# Patient Record
Sex: FEMALE | Race: WHITE | Employment: PART TIME | URBAN - METROPOLITAN AREA
[De-identification: names, ages, dates, MRNs, and addresses within clinical notes are randomized per-mention and may not be internally consistent; named-entity substitution may affect disease eponyms.]

---
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
## 2020-05-29 ENCOUNTER — OFFICE VISIT (OUTPATIENT)
Dept: SURGERY | Age: 20
End: 2020-05-29
Payer: COMMERCIAL

## 2020-05-29 VITALS
DIASTOLIC BLOOD PRESSURE: 80 MMHG | RESPIRATION RATE: 16 BRPM | HEART RATE: 86 BPM | WEIGHT: 135 LBS | HEIGHT: 60 IN | SYSTOLIC BLOOD PRESSURE: 110 MMHG | BODY MASS INDEX: 26.5 KG/M2 | OXYGEN SATURATION: 97 % | TEMPERATURE: 98.1 F

## 2020-05-29 PROCEDURE — 1036F TOBACCO NON-USER: CPT | Performed by: PLASTIC SURGERY

## 2020-05-29 PROCEDURE — G8419 CALC BMI OUT NRM PARAM NOF/U: HCPCS | Performed by: PLASTIC SURGERY

## 2020-05-29 PROCEDURE — G8427 DOCREV CUR MEDS BY ELIG CLIN: HCPCS | Performed by: PLASTIC SURGERY

## 2020-05-29 PROCEDURE — 99204 OFFICE O/P NEW MOD 45 MIN: CPT | Performed by: PLASTIC SURGERY

## 2020-05-29 NOTE — PROGRESS NOTES
Medical: Not on file     Non-medical: Not on file   Tobacco Use    Smoking status: Never Smoker    Smokeless tobacco: Never Used   Substance and Sexual Activity    Alcohol use: No    Drug use: No    Sexual activity: Never   Lifestyle    Physical activity     Days per week: Not on file     Minutes per session: Not on file    Stress: Not on file   Relationships    Social connections     Talks on phone: Not on file     Gets together: Not on file     Attends Mu-ism service: Not on file     Active member of club or organization: Not on file     Attends meetings of clubs or organizations: Not on file     Relationship status: Not on file    Intimate partner violence     Fear of current or ex partner: Not on file     Emotionally abused: Not on file     Physically abused: Not on file     Forced sexual activity: Not on file   Other Topics Concern    Not on file   Social History Narrative    Not on file     Family History:   Family History   Problem Relation Age of Onset    No Known Problems Mother     Cancer Father         colon    Breast Cancer Maternal Grandmother         lymphoma    Cancer Paternal Grandmother         lung       REVIEW OF SYSTEMS:    CONSTITUTIONAL:  negative for  fevers, chills, sweats and fatigue  EYES: negative for dipolpia or acute vision loss. RESPIRATORY:  negative for  dry cough, cough with sputum, dyspnea, wheezing and chest pain  HENT:negative for pain, headache, difficulty swallowing or nose bleeds. CARDIOVASCULAR:  negative for  chest pain, dyspnea, palpitations, syncope  GASTROINTESTINAL:  negative for nausea, vomiting, change in bowel habits, diarrhea, constipation and abdominal pain  EXTREMITIES: negative for edema  MUSCULOSKELETAL: negative for muscle weakness  SKIN: positive for lesionnegative for itching or rashes.   HEME: negative for easy brusing or bleeding  BEHAVIOR/PSYCH:  negative for poor appetite, increased appetite, decreased sleep and poor concentration  PSYCH: Alert and oriented to person place and time    I have reviewed the chief complaint and history of present illness including (ROS and PFSH) and vital documentation by my staff and I agree with their documentation and have added when applicable. PHYSICAL EXAM:        VITALS:  /80 (Site: Left Upper Arm, Position: Sitting, Cuff Size: Medium Adult)   Pulse 86   Temp 98.1 °F (36.7 °C) (Temporal)   Resp 16   Ht 5' (1.524 m)   Wt 135 lb (61.2 kg)   LMP 05/18/2020   SpO2 97%   Breastfeeding No   BMI 26.37 kg/m²   CONSTITUTIONAL:  awake, alert, cooperative, no apparent distress, and appears stated age  EYES: PERRLA, EOMI, no signs of occular infection  LUNGS:  No increased work of breathing, good air exchange  CARDIOVASCULAR:  regular rate and rhythm   EXTREMITIES: no signs of clubbing or cyanosis. MUSCULOSKELETAL: negative for flaccid muscle tone or spastic movements. NEURO: Cranial nerves II-XII grossly intact. No signs of agitated mood. SKIN: Right parietal scalp-  10mm x 10mm,  flesh in color, irregular border, raised, no signs of bleeding,drainage or infection. Non tender to palpation. There is a small 1 mm mass just anterior and medial to the prominent lesion. No pain or punctum.      DATA:    Labs: CBC:   Lab Results   Component Value Date    WBC 9.8 05/23/2013    RBC 4.05 05/23/2013    HGB 12.0 05/23/2013    HCT 36.0 05/23/2013    MCV 88.9 05/23/2013    MCH 29.6 05/23/2013    MCHC 33.3 05/23/2013    RDW 13.1 05/23/2013     05/23/2013    MPV 8.6 05/23/2013     BMP:    Lab Results   Component Value Date     05/22/2013    K 4.1 05/22/2013     05/22/2013    CO2 27 05/22/2013    BUN 8 05/22/2013    LABALBU 5.1 05/22/2013    CREATININE 0.6 05/22/2013    CALCIUM 9.7 05/22/2013    GLUCOSE 104 05/22/2013       Radiology Review:  No radiology needed at this time    IMPRESSION/RECOMMENDATIONS:      Diagnosis  -) Subcutaneous mass of right parietal scalp      -The

## 2020-06-02 ENCOUNTER — TELEPHONE (OUTPATIENT)
Dept: SURGERY | Age: 20
End: 2020-06-02

## 2020-08-19 ENCOUNTER — HOSPITAL ENCOUNTER (OUTPATIENT)
Age: 20
Discharge: HOME | End: 2020-08-21
Payer: COMMERCIAL

## 2020-08-19 ENCOUNTER — PROCEDURE VISIT (OUTPATIENT)
Dept: SURGERY | Age: 20
End: 2020-08-19
Payer: COMMERCIAL

## 2020-08-19 VITALS
WEIGHT: 135 LBS | HEIGHT: 60 IN | BODY MASS INDEX: 26.5 KG/M2 | TEMPERATURE: 97.7 F | OXYGEN SATURATION: 98 % | DIASTOLIC BLOOD PRESSURE: 62 MMHG | SYSTOLIC BLOOD PRESSURE: 94 MMHG

## 2020-08-19 PROCEDURE — 88304 TISSUE EXAM BY PATHOLOGIST: CPT

## 2020-08-19 PROCEDURE — 11421 EXC H-F-NK-SP B9+MARG 0.6-1: CPT | Performed by: PLASTIC SURGERY

## 2020-08-19 PROCEDURE — 87491 CHLMYD TRACH DNA AMP PROBE: CPT

## 2020-08-19 PROCEDURE — 12031 INTMD RPR S/A/T/EXT 2.5 CM/<: CPT | Performed by: PLASTIC SURGERY

## 2020-08-19 PROCEDURE — 87591 N.GONORRHOEAE DNA AMP PROB: CPT

## 2020-08-19 RX ORDER — LIDOCAINE HYDROCHLORIDE AND EPINEPHRINE 10; 10 MG/ML; UG/ML
3 INJECTION, SOLUTION INFILTRATION; PERINEURAL ONCE
Status: COMPLETED | OUTPATIENT
Start: 2020-08-19 | End: 2020-08-19

## 2020-08-19 RX ADMIN — LIDOCAINE HYDROCHLORIDE AND EPINEPHRINE 3 ML: 10; 10 INJECTION, SOLUTION INFILTRATION; PERINEURAL at 13:48

## 2020-08-19 NOTE — PROGRESS NOTES
Subjective: Follow up today for excisional biopsy of subcutaneous mass of scalp x2. Denies fever, nausea, vomiting, leg pain or swelling. The patient voices understanding of the procedure they are having today and would like to proceed. Objective:    BP 94/62 (Site: Left Upper Arm, Position: Sitting, Cuff Size: Medium Adult)   Temp 97.7 °F (36.5 °C) (Temporal)   Ht 5' (1.524 m)   Wt 135 lb (61.2 kg)   SpO2 98%   BMI 26.37 kg/m²       Midline scalp inferior mass  Lesion- 10mm x 10mm  Margin- 2mm  Defect- 10mm x 8mm  Scar-12mm     Midline scalp superior mass  Lesion- 5mm x 5mm  Margin- 2mm  Defect- 5mm x 2mm  Scar-5mm     Assessment:    Patient Active Problem List   Diagnosis    Appendicitis       Plan:       Diagnosis  -) Subcutaneous mass of scalp x2      -The risks, benefits and options were discussed with the pt. The risks included but not limited to pain, bleeding, infection, heavy scarring, damage to surrounding structures, fluid collections, asymmetry, and need for further procedures. All of Her questions were answered to their satisfaction and She agrees to proceed with the procedure.      -After consent was obtained, the area was cleansed with an alcohol swab. Local anesthesia consisting of 1% lidocaine with 1:100,000 epinepherine was injected  surrounding the area. The local was allowed to work. Using a 10-blade the Subcutaneous mass of scalp x2 was excised,  Intermediate  repair was performed. The wound(s) were closed with 5-0 Monocryl and 5-0 fast absorbing gut suture, hemostasis was obtained and a dressing was placed over the wound. The procedure was performed by Dr Sahra Tracy    Please schedule an appointment to be seen on Follow-up with our office in 7-14 days  Diet: regular diet  Activity: no heavy lifting for 7 days. Shower/Bathing: OK to shower over the wound site in 48 hours. No baths, hot tubs or soaking of the wound site at this time. Pt voices understanding.      Wound care: Bacitracin will need to be placed over the wound site twice daily and covered with a gauze pad. The gauze pad can be changed as needed for saturation      Medications: As prescribed  Educated to contact physician with concerns or signs of infection (redness, increasing pain, discharge, odor, fever). The entirety of the procedure was performed by Dr. Shaila Haro with first assistance by Ok Olmedo       Please note that the medical decision making for the patient as well as the subjective, objective, assessment and plan were reviewed and performed by Dr Shaila Haro        This document is generated, in part, by voice recognition software and thus  syntax and grammatical errors are possible.     Karina Martin  12:22 PM  8/26/2020

## 2020-08-24 LAB
Lab: NORMAL
REPORT: NORMAL
THIS TEST SENT TO: NORMAL

## 2020-08-31 ENCOUNTER — OFFICE VISIT (OUTPATIENT)
Dept: SURGERY | Age: 20
End: 2020-08-31

## 2020-08-31 VITALS — HEIGHT: 60 IN | TEMPERATURE: 97.4 F | BODY MASS INDEX: 26.5 KG/M2 | WEIGHT: 135 LBS

## 2020-08-31 PROCEDURE — 99024 POSTOP FOLLOW-UP VISIT: CPT | Performed by: PHYSICIAN ASSISTANT

## 2020-08-31 NOTE — PROGRESS NOTES
Subjective: Follow up today from excisional biopsy of posterior scalp subcutaneous masses x2. Denies fever, nausea, vomiting, leg pain or swelling, pain is absent. The pt states that they have  kept the wound site(s) covered with bacitracin. The patient states that they have  finished their antibiotic. They state that their pain is absent. Objective:    [facility] 08/10/2020 (Exact Date)       Wound x 2: Clean, dry, and intact, no drainage. No signs of infection, wound healing well    Neuro- Sensation  intact to arben incisional site with light touch . Cranial nerves II-XII grossly intact.      Assessment:    Patient Active Problem List   Diagnosis    Appendicitis       Labs: CBC:   Lab Results   Component Value Date    WBC 9.8 05/23/2013    RBC 4.05 05/23/2013    HGB 12.0 05/23/2013    HCT 36.0 05/23/2013    MCV 88.9 05/23/2013    MCH 29.6 05/23/2013    MCHC 33.3 05/23/2013    RDW 13.1 05/23/2013     05/23/2013    MPV 8.6 05/23/2013     BMP:    Lab Results   Component Value Date     05/22/2013    K 4.1 05/22/2013     05/22/2013    CO2 27 05/22/2013    BUN 8 05/22/2013    LABALBU 5.1 05/22/2013    CREATININE 0.6 05/22/2013    CALCIUM 9.7 05/22/2013    GLUCOSE 104 05/22/2013         Pathology Report- [address] [place], [place] [address]     [address]            [address]                                                   [address] [facility]   [place], [address], [address] [place] [address]               FINAL SURGICAL PATHOLOGY REPORT     NAME:           Marilyn Null [facility]       Date of       08/19/2020                                            Collection:   Medical Record   [id number]              Date of       08/20/2020   Number:                          You can start scar massage once incision is completely healed and strong enough to handle the motion (usually 10 - 14 days post operatively).  You use lotion to do the scar massage to allow ease with motion over the scar and prevent friction at the area. Patient had no further questions. F/U PRN  Call office with concerns or signs of infection.     Adrienne Lugo

## 2020-10-05 ENCOUNTER — HOSPITAL ENCOUNTER (OUTPATIENT)
Age: 20
Discharge: HOME | End: 2020-10-07
Payer: COMMERCIAL

## 2020-10-05 PROCEDURE — 87491 CHLMYD TRACH DNA AMP PROBE: CPT

## 2020-10-05 PROCEDURE — 87591 N.GONORRHOEAE DNA AMP PROB: CPT

## 2020-10-12 LAB
Lab: NORMAL
REPORT: NORMAL
THIS TEST SENT TO: NORMAL

## 2020-12-21 DIAGNOSIS — Z01.419 WOMEN'S ANNUAL ROUTINE GYNECOLOGICAL EXAMINATION: ICD-10-CM

## 2021-12-22 ENCOUNTER — OFFICE VISIT (OUTPATIENT)
Dept: PRIMARY CARE CLINIC | Age: 21
End: 2021-12-22
Payer: COMMERCIAL

## 2021-12-22 VITALS
OXYGEN SATURATION: 98 % | WEIGHT: 130 LBS | TEMPERATURE: 98.7 F | HEIGHT: 60 IN | SYSTOLIC BLOOD PRESSURE: 110 MMHG | DIASTOLIC BLOOD PRESSURE: 70 MMHG | HEART RATE: 99 BPM | BODY MASS INDEX: 25.52 KG/M2

## 2021-12-22 DIAGNOSIS — R05.9 COUGH: ICD-10-CM

## 2021-12-22 DIAGNOSIS — U07.1 COVID-19: Primary | ICD-10-CM

## 2021-12-22 LAB
Lab: ABNORMAL
PERFORMING INSTRUMENT: ABNORMAL
QC PASS/FAIL: ABNORMAL
SARS-COV-2, POC: DETECTED

## 2021-12-22 PROCEDURE — 99213 OFFICE O/P EST LOW 20 MIN: CPT | Performed by: NURSE PRACTITIONER

## 2021-12-22 PROCEDURE — 87426 SARSCOV CORONAVIRUS AG IA: CPT | Performed by: NURSE PRACTITIONER

## 2021-12-22 NOTE — PATIENT INSTRUCTIONS
Last day of covid quarantine is 12/30/2021    Patient Education        10 Things to Do When You Have COVID-19      Stay home. Don't go to school, work, or public areas. And don't use public transportation, ride-shares, or taxis unless you have no choice. Leave your home only if you need to get medical care. But call the doctor's office first so they know you're coming. And wear a mask. Ask before leaving isolation. Follow your doctor's advice about when it is safe for you to leave isolation. Wear a mask when you are around other people. It can help stop the spread of the virus. Limit contact with people in your home. If possible, stay in a separate bedroom and use a separate bathroom. Avoid contact with pets and other animals. If possible, have a friend or family member care for them while you're sick. Cover your mouth and nose with a tissue when you cough or sneeze. Then throw the tissue in the trash right away. Wash your hands often, especially after you cough or sneeze. Use soap and water, and scrub for at least 20 seconds. If soap and water aren't available, use an alcohol-based hand . Don't share personal household items. These include bedding, towels, cups and glasses, and eating utensils. Clean and disinfect your home every day. Use household  or disinfectant wipes or sprays. If needed, take acetaminophen (Tylenol) or ibuprofen (Advil, Motrin) to relieve fever and body aches. Read and follow all instructions on the label. Current as of: March 26, 2021               Content Version: 13.1  © 2006-2021 [employer]. Care instructions adapted under license by [facility] ([facility]). If you have questions about a medical condition or this instruction, always ask your healthcare professional. [place] [address] any warranty or liability for your use of this information.        vitamin c, zinc, vitamin d    ckaritin d or zyrtec d - generic over the counterwnlno

## 2021-12-22 NOTE — PROGRESS NOTES
Chief Complaint   Congestion (x 1 day), Cough, and Otalgia (ear fullness)      History of Present Illness   Source of history provided by:  patientKatrina Sinha is a 24 y.o. old female who has a past medical history of:   Past Medical History:   Diagnosis Date    Known health problems: none     none per pt    Presents to the flu clinic with complaints of Chills, Generalized Body Aches, Nasal Congestion and dry Cough x 2 days. States symptoms have stayed the same since onset. Has been taking NSAID without symptomatic relief. Denies any Fever, Shortness of breath, Nausea, Vomiting or Close contact with a lab confirmed COVID-19 patient within 14 days of symptom onset . Denies any hx of no history of pneumonia or bronchitis. ROS   Pertinent positives and negatives are stated within HPI, all other systems reviewed and are negative. Surgical History:  has a past surgical history that includes Appendectomy (05/2012). Social History:  reports that she has never smoked. She has never used smokeless tobacco. She reports that she does not drink alcohol and does not use drugs. Family History: family history includes Breast Cancer in her maternal grandmother; Cancer in her father and paternal grandmother; No Known Problems in her mother. Allergies: Patient has no known allergies. Physical Exam      VS:  /70   Pulse 99   Temp 98.7 °F (37.1 °C)   Ht 5' (1.524 m)   Wt 130 lb (59 kg)   SpO2 98%   BMI 25.39 kg/m²    Oxygen Saturation Interpretation: Normal.    Constitutional:  Alert, development consistent with age. NAD. Head:  NC/NT. Airway patent. Ears: TMs wnl bilaterally. Canals without exudate or swelling bilaterally. Mouth: Posterior pharynx with mild erythema and clear postnasal drip. no tonsillar hypertrophy or exudate. Neck:  Normal ROM. Supple. no anterior cervical adenopathy noted. Lungs: CTAB without wheezes, rales, or rhonchi.    CV:  Regular rate and rhythm, normal heart sounds, without pathological murmurs, ectopy, gallops, or rubs. Skin:  Normal turgor. Warm, dry, without visible rash. Lymphatic: No lymphangitis or adenopathy noted. Neurological:  Oriented. Motor functions intact. Lab / Imaging Results   (All laboratory and radiology results have been personally reviewed by myself)  Labs:  Results for orders placed or performed in visit on 12/22/21   POCT COVID-19, Antigen   Result Value Ref Range    SARS-COV-2, POC Detected (A) Not Detected    Lot Number [id number]     QC Pass/Fail pass     Performing Instrument BD Veritor        Imaging: All Radiology results interpreted by Radiologist unless otherwise noted. No results found. Medical Decision Making   Pt non-toxic, in no apparent distress and stable at time of discharge. Assessment/Plan   [facility] Floor was seen today for congestion, cough and otalgia. Diagnoses and all orders for this visit:    COVID-19    Cough  -     POCT COVID-19, Antigen        ncrease fluids and rest. Symptomatic relief discussed including Tylenol prn pain/fever. Schedule f/u with PCP in 7-10 days if symptoms persist. ED sooner if symptoms worsen or change. ED immediately with high or refractory fever, progressive SOB, dyspnea, CP, calf pain/swelling, shaking chills, vomiting, abdominal pain, lethargy, flank pain, or decreased urinary output. Pt verbalizes understanding and is in agreement with plan of care. All questions answered. Guillermo Rosa, APRN - CNP    This visit was provided as a focused evaluation during the COVID -19 pandemic/national emergency. A comprehensive review of all previous patient history and testing was not conducted. Pertinent findings were elicited during the visit.